# Patient Record
Sex: MALE | Race: WHITE | URBAN - METROPOLITAN AREA
[De-identification: names, ages, dates, MRNs, and addresses within clinical notes are randomized per-mention and may not be internally consistent; named-entity substitution may affect disease eponyms.]

---

## 2020-10-16 ENCOUNTER — OFFICE VISIT (OUTPATIENT)
Dept: ORTHOPEDIC SURGERY | Age: 30
End: 2020-10-16
Payer: COMMERCIAL

## 2020-10-16 VITALS — BODY MASS INDEX: 30.11 KG/M2 | WEIGHT: 255 LBS | HEIGHT: 77 IN

## 2020-10-16 PROCEDURE — 99203 OFFICE O/P NEW LOW 30 MIN: CPT | Performed by: ORTHOPAEDIC SURGERY

## 2020-10-16 NOTE — PROGRESS NOTES
Date of Encounter: 10/16/2020  Patient Name:Tereso De Leon  Medical Record Number: [id number]    Chief Complaint   Patient presents with    Knee Pain     LT knee        History of Present Illness:  Wendi Mendoza is a 31 y.o. male here for evaluation of his left knee. His current symptoms are described below and I reviewed them with him today. Patient has about a 6-week history of left medial knee pain. He is got some catching and popping but not much swelling. He does not recall a specific injury. Does have a 3year-old son do his down on the floor playing with him a lot. Felt like it was getting a little bit better but [place] Monday was extremely painful. He has been wearing a brace with some improvement in his symptoms. Over-the-counter anti-inflammatory medicine has really been ineffective       History reviewed. No pertinent past medical history. History reviewed. No pertinent surgical history. Current Outpatient Medications   Medication Sig Dispense Refill    Loratadine (CLARITIN PO) Take  by mouth. No current facility-administered medications for this visit. allergies, social and family histories were reviewed and updated as appropriate. Review of Systems:  Relevant review of systems reviewed and available in the patient's chart and scanned in under the MEDIA tab today. Vital Signs:  Ht 6' 5\" (1.956 m)   Wt 255 lb (115.7 kg)   BMI 30.24 kg/m²     General Exam:   Constitutional: He is adequately groomed with no evidence of malnutrition, obesity absent  Mental Status: He is oriented to time, place and person. Normal mentation and affect for age. Lymphatic: The lymphatic examination bilaterally reveals all areas to be without enlargement or induration. Vascular: Examination reveals no swelling or calf tenderness. Peripheral pulses are palpable and 2+. Neurological: He has good coordination and balance.   There is no focal weakness or sensory deficit. Left Knee Examination:    Inspection:  Knee shows neutral alignment  Normal muscle bulk and tone for his age and activity level. No erythema or significant effusion. Palpation: Tender to palpation over the medial joint line    Range of Motion: -5 to 120 with mild pain    Strength:  Quad 5/ 5  ; Hamstrings 5/ 5. Gross motor to hip and ankle intact, no pain with logroll the hip. Stinchfield examination negative. Special Tests:      Negative Lachman    negative anterior drawer    no posterior sag    no posterior drawer   Negative patellar grind.  does not open to valgus or varus stress at 0 or 30°    Positive Cyn's    negative Homans    Posterior tibial pulses are +2/4 capillary refill is brisk sensation is intact. Skin: There are no rashes, ulcerations or lesions. Gait: Normal    Radiology:     X-rays obtained today and reviewed in office:  Views 4 Left  Knee with comparison AP, flexion PA and skyline views of the left knee  Impression No evidence for acute fracture or subluxation / dislocation. Well-preserved joint spaces. No lytic or blastic lesions within the metaphyseal regions of either knee. Impression:  Encounter Diagnosis   Name Primary?  Left knee pain, unspecified chronicity Yes       Office Procedures:  Orders Placed This Encounter   Procedures    XR KNEE LEFT (MIN 4 VIEWS)     Standing Status:   Future     Number of Occurrences:   1     Standing Expiration Date:   10/16/2021    XR KNEE RIGHT (1-2 VIEWS)     Standing Status:   Future     Number of Occurrences:   1     Standing Expiration Date:   10/16/2021    MRI KNEE RIGHT WO CONTRAST     Standing Status:   Future     Standing Expiration Date:   10/16/2021     Scheduling Instructions:      MRI at [facility]. Please call to schedule       Treatment Plan:   We discussed treatment options. Patient likely has a tear of his medial meniscus. Has significant mechanical symptoms.   Will obtain an MRI. Have discussed arthroscopy should that become necessary we will call him with results    Oma Justice understands and accepts this course of care    Documentation was done using voice recognition dragon software. Every effort was made to ensure accuracy; however, inadvertent  Unintentional computerized transcription errors may be present.

## 2020-11-10 ENCOUNTER — TELEPHONE (OUTPATIENT)
Dept: ORTHOPEDIC SURGERY | Age: 30
End: 2020-11-10

## 2020-11-10 NOTE — TELEPHONE ENCOUNTER
Called and spoke to patient regarding MRI results. Discussed the benefits of arthroscopy. This is already been scheduled at his convenience.

## 2020-12-01 ENCOUNTER — TELEPHONE (OUTPATIENT)
Dept: ORTHOPEDIC SURGERY | Age: 30
End: 2020-12-01

## 2020-12-01 NOTE — TELEPHONE ENCOUNTER
CPT: [id number]  BODY PART: left knee  AUTHORIZATION: approved    Submitted online 12/1/20    Approved #[id number] 12/17/20-3/17/21

## 2020-12-09 NOTE — TELEPHONE ENCOUNTER
[facility]ERY HAS BEEN CHANGED TO [facility]. SPOKE WITH KANNAN BROWN/[facility]  AND IT IS NOW PENDING STATUS. PENDING # IS [id number]. IF NO APPROVAL RESPONSE BY Friday CALL  [facility]   TO GET SX PA ESCALATED. NOTE:  ALSO THE SURGERY WAS ENTERED INCORRECTLY.   THE SURGERY DATE IS 12/14/20  SX VALID 12/14/20 TO 03/14/21

## 2020-12-18 ENCOUNTER — OFFICE VISIT (OUTPATIENT)
Dept: ORTHOPEDIC SURGERY | Age: 30
End: 2020-12-18

## 2020-12-18 VITALS — BODY MASS INDEX: 30.11 KG/M2 | WEIGHT: 255 LBS | HEIGHT: 77 IN

## 2020-12-18 PROCEDURE — 99024 POSTOP FOLLOW-UP VISIT: CPT | Performed by: ORTHOPAEDIC SURGERY

## 2020-12-18 NOTE — PROGRESS NOTES
Date of Encounter: 12/18/2020  Patient Name:Tereso De Leon    Chief Complaint   Patient presents with    Knee Pain     left knee arthroscopy       History of Present Illness:  Patient is 4 days out from his left knee arthroscopy. Had a large flap tear of his medial meniscus and a small lateral meniscus tear. Actually doing fairly well. Little stiff. Off of his crutches. History reviewed. No pertinent past medical history. History reviewed. No pertinent surgical history. Current Outpatient Medications   Medication Sig Dispense Refill    Loratadine (CLARITIN PO) Take  by mouth. No current facility-administered medications for this visit. allergies, social and family histories were reviewed and updated as appropriate. Review of Systems:  Relevant review of systems reviewed and available in the patient's chart and scanned in under the MEDIA tab on 12/18/2020. Vital Signs:  Ht 6' 5\" (1.956 m)   Wt 255 lb (115.7 kg)   BMI 30.24 kg/m²     General Exam:   Constitutional: He is adequately groomed with no evidence of malnutrition, obesity absent  Mental Status: He is oriented to time, place and person. Normal mentation and affect for age. Lymphatic: The lymphatic examination bilaterally reveals all areas to be without enlargement or induration. Vascular: Examination reveals no swelling or calf tenderness. Peripheral pulses are palpable and 2+. Neurological: He has good coordination and balance. There is no focal weakness or sensory deficit. Pertinent Exam:  Portals are healing up nicely. Does have a mild amount of swelling. Some limited flexion. Xray Findings:  No new x-rays were obtained    Assessment & Plan:  Patient had medial meniscus tear and minimal chondromalacia. Doing well. At this point does not feel the need for any formal therapy but will call if that changes. Going back to work as of Monday. I will see him back in 4 weeks if he is having any problems. Documentation was done using voice recognition dragon software. Every effort was made to ensure accuracy; however, inadvertent  Unintentional computerized transcription errors may be present.